# Patient Record
Sex: FEMALE | Race: WHITE
[De-identification: names, ages, dates, MRNs, and addresses within clinical notes are randomized per-mention and may not be internally consistent; named-entity substitution may affect disease eponyms.]

---

## 2017-11-14 ENCOUNTER — HOSPITAL ENCOUNTER (EMERGENCY)
Dept: HOSPITAL 45 - C.EDB | Age: 22
Discharge: HOME | End: 2017-11-14
Payer: COMMERCIAL

## 2017-11-14 VITALS
HEIGHT: 64.02 IN | WEIGHT: 112.44 LBS | BODY MASS INDEX: 19.2 KG/M2 | WEIGHT: 112.44 LBS | BODY MASS INDEX: 19.2 KG/M2 | HEIGHT: 64.02 IN

## 2017-11-14 VITALS — TEMPERATURE: 98.24 F

## 2017-11-14 VITALS — SYSTOLIC BLOOD PRESSURE: 110 MMHG | DIASTOLIC BLOOD PRESSURE: 67 MMHG | HEART RATE: 59 BPM | OXYGEN SATURATION: 98 %

## 2017-11-14 DIAGNOSIS — R06.02: ICD-10-CM

## 2017-11-14 DIAGNOSIS — R07.1: Primary | ICD-10-CM

## 2017-11-14 DIAGNOSIS — F17.200: ICD-10-CM

## 2017-11-14 LAB
ALBUMIN/GLOB SERPL: 1.1 {RATIO} (ref 0.9–2)
ALP SERPL-CCNC: 64 U/L (ref 45–117)
ALT SERPL-CCNC: 24 U/L (ref 12–78)
ANION GAP SERPL CALC-SCNC: 4 MMOL/L (ref 3–11)
AST SERPL-CCNC: 17 U/L (ref 15–37)
BASOPHILS # BLD: 0.02 K/UL (ref 0–0.2)
BASOPHILS NFR BLD: 0.3 %
BUN SERPL-MCNC: 12 MG/DL (ref 7–18)
BUN/CREAT SERPL: 19.3 (ref 10–20)
CALCIUM SERPL-MCNC: 9.2 MG/DL (ref 8.5–10.1)
CHLORIDE SERPL-SCNC: 106 MMOL/L (ref 98–107)
CO2 SERPL-SCNC: 29 MMOL/L (ref 21–32)
COMPLETE: YES
CREAT CL PREDICTED SERPL C-G-VRATE: 114.6 ML/MIN
CREAT SERPL-MCNC: 0.62 MG/DL (ref 0.6–1.2)
EOSINOPHIL NFR BLD AUTO: 184 K/UL (ref 130–400)
GLOBULIN SER-MCNC: 3.5 GM/DL (ref 2.5–4)
GLUCOSE SERPL-MCNC: 87 MG/DL (ref 70–99)
HCT VFR BLD CALC: 38.8 % (ref 37–47)
IG%: 0.2 %
IMM GRANULOCYTES NFR BLD AUTO: 33.3 %
LYMPHOCYTES # BLD: 2.14 K/UL (ref 1.2–3.4)
MAGNESIUM SERPL-MCNC: 2.4 MG/DL (ref 1.8–2.4)
MCH RBC QN AUTO: 34.1 PG (ref 25–34)
MCHC RBC AUTO-ENTMCNC: 34.8 G/DL (ref 32–36)
MCV RBC AUTO: 98 FL (ref 80–100)
MONOCYTES NFR BLD: 8.6 %
NEUTROPHILS # BLD AUTO: 0.8 %
NEUTROPHILS NFR BLD AUTO: 56.8 %
PMV BLD AUTO: 10.4 FL (ref 7.4–10.4)
POTASSIUM SERPL-SCNC: 3.4 MMOL/L (ref 3.5–5.1)
RBC # BLD AUTO: 3.96 M/UL (ref 4.2–5.4)
SODIUM SERPL-SCNC: 139 MMOL/L (ref 136–145)
TSH SERPL-ACNC: 1.02 UIU/ML (ref 0.3–4.5)
WBC # BLD AUTO: 6.43 K/UL (ref 4.8–10.8)

## 2017-11-14 NOTE — DIAGNOSTIC IMAGING REPORT
CHEST 2 VIEWS ROUTINE



CLINICAL HISTORY: 22 years-old Female presenting with b/l upper chest pain,

cough. 



TECHNIQUE: PA and lateral views of the chest were obtained.



COMPARISON: None.



FINDINGS:

Cardiomediastinal silhouette normal. Lungs and pleural spaces clear. Osseous

structures normal. Mild gaseous distention of colon.



IMPRESSION:

1.  No acute cardiopulmonary disease.







Electronically signed by:  Matthew Kaye M.D.

11/14/2017 7:32 PM



Dictated Date/Time:  11/14/2017 7:30 PM

## 2017-11-14 NOTE — EMERGENCY ROOM VISIT NOTE
History


Report prepared by Bhupendra:  Sherine Mcrae


Under the Supervision of:  Dr. Mercedez Cage D.O.


First contact with patient:  18:03


Chief Complaint:  RESPIRATORY PROBLEMS


Stated Complaint:  LUNG PAIN,DIFFICULTY TAKING DEEP BREATHS


Nursing Triage Summary:  


pt c/o it hurting to take a deep breathe for the past 4 days





History of Present Illness


The patient is a 22 year old female who presents to the Emergency Room with 

complaints of constant lung pain beginning 5 days ago. The patient states that 

she has been having pain in her upper chest for the past 5 days and it feels 

like she is not getting a deep breath in the upper part of her lungs. She 

reports that the pain is worsened with laughing, sneezing, coughing, and 

palpation. She notes that she has had a runny nose, shortness of breath, and 

works long shifts as a  and is around people that may be sick frequently. 

The patient denies any fever, chills, stuffy nose, sore throat, nausea, vomiting

, dizziness, lightheadedness, leg swelling, urinary symptoms, bowel symptoms, 

and chance of pregnancy.  No recent change in activity or trauma.





   Source of History:  patient


   Onset:  5 days ago


   Position:  chest


   Timing:  constant


   Modifying Factors (Worsening):  breathing, other (laughing, coughing)


   Associated Symptoms:  + SOB, No fevers, No chills, No sorethroat, No nausea, 

No vomiting, No urinary symptoms


Note:


Pt complains of runny nose. The patient denies any stuffy nose, vomiting, 

dizziness, lightheadedness, leg swelling, bowel symptoms, and chance of 

pregnancy.





Review of Systems


See HPI for pertinent positives & negatives. A total of 10 systems reviewed and 

were otherwise negative.





Past Medical & Surgical


Medical Problems:


(1) No Known Active Medical Problems








Family History


No pertinent family history stated.





Social History


Smoking Status:  Current Every Day Smoker


Marital Status:  single


Housing Status:  lives with roommate


Occupation Status:  [employer] student





Current/Historical Medications


Scheduled


Iud's (Paragard Intrauterine ), 1 EA INT UTER UD





Allergies


Coded Allergies:  


     Sulfamethoxazole w/Trimethoprim (Verified  Allergy, Intermediate, Rash/

Hives, 11/14/17)





Physical Exam


Vital Signs











  Date Time  Temp Pulse Resp B/P (MAP) Pulse Ox O2 Delivery O2 Flow Rate FiO2


 


11/14/17 20:32  59 18 110/67 98   


 


11/14/17 19:49  60 18 107/70 98 Room Air  


 


11/14/17 18:12     98 Room Air  


 


11/14/17 18:00 36.8 78 16 113/65 96 Room Air  











Physical Exam


GENERAL: alert, well appearing, well nourished, no distress, non-toxic 


EYE EXAM: normal conjunctiva, PERRL and EOM's grossly intact


OROPHARYNX: no exudate, no erythema, lips, buccal mucosa, and tongue normal and 

mucous membranes are moist


NECK: supple, no nuchal rigidity, no adenopathy, non-tender


LUNGS: Clear to auscultation. Normal chest wall mechanics


HEART: no murmurs, S1 normal and S2 normal 


CHEST: Some reproducible chest wall pain with palpation.


ABDOMEN: abdomen soft, non-tender, normo-active bowel sounds, no masses, no 

rebound or guarding. 


BACK: Back is symmetrical on inspection and there is no deformity, no midline 

tenderness, no CVA tenderness. 


SKIN: no rashes and no bruising 


UPPER EXTREMITIES: upper extremities are grossly normal. 


LOWER EXTREMITIES: No pitting edema.


NEURO EXAM: Normal sensorium, cranial nerves II-XII grossly intact, normal 

speech,  no gross weakness of arms, no gross weakness of legs.





Medical Decision & Procedures


ER Provider


Diagnostic Interpretation:


Radiology results have been interpreted by the radiologist and reviewed by me.





CHEST 2 VIEWS ROUTINE





FINDINGS:


Cardiomediastinal silhouette normal. Lungs and pleural spaces clear. Osseous


structures normal. Mild gaseous distention of colon.





IMPRESSION:


1.  No acute cardiopulmonary disease.





Electronically signed by:  Matthew Kaye M.D.


11/14/2017 7:32 PM





Dictated Date/Time:  11/14/2017 7:30 PM





Laboratory Results


11/14/17 18:30








Red Blood Count 3.96, Mean Corpuscular Volume 98.0, Mean Corpuscular Hemoglobin 

34.1, Mean Corpuscular Hemoglobin Concent 34.8, Mean Platelet Volume 10.4, 

Neutrophils (%) (Auto) 56.8, Lymphocytes (%) (Auto) 33.3, Monocytes (%) (Auto) 

8.6, Eosinophils (%) (Auto) 0.8, Basophils (%) (Auto) 0.3, Neutrophils # (Auto) 

3.66, Lymphocytes # (Auto) 2.14, Monocytes # (Auto) 0.55, Eosinophils # (Auto) 

0.05, Basophils # (Auto) 0.02





11/14/17 18:30

















Test


  11/14/17


18:30


 


White Blood Count


  6.43 K/uL


(4.8-10.8)


 


Red Blood Count


  3.96 M/uL


(4.2-5.4)


 


Hemoglobin


  13.5 g/dL


(12.0-16.0)


 


Hematocrit 38.8 % (37-47) 


 


Mean Corpuscular Volume


  98.0 fL


()


 


Mean Corpuscular Hemoglobin


  34.1 pg


(25-34)


 


Mean Corpuscular Hemoglobin


Concent 34.8 g/dl


(32-36)


 


Platelet Count


  184 K/uL


(130-400)


 


Mean Platelet Volume


  10.4 fL


(7.4-10.4)


 


Neutrophils (%) (Auto) 56.8 % 


 


Lymphocytes (%) (Auto) 33.3 % 


 


Monocytes (%) (Auto) 8.6 % 


 


Eosinophils (%) (Auto) 0.8 % 


 


Basophils (%) (Auto) 0.3 % 


 


Neutrophils # (Auto)


  3.66 K/uL


(1.4-6.5)


 


Lymphocytes # (Auto)


  2.14 K/uL


(1.2-3.4)


 


Monocytes # (Auto)


  0.55 K/uL


(0.11-0.59)


 


Eosinophils # (Auto)


  0.05 K/uL


(0-0.5)


 


Basophils # (Auto)


  0.02 K/uL


(0-0.2)


 


RDW Standard Deviation


  43.5 fL


(36.4-46.3)


 


RDW Coefficient of Variation


  12.2 %


(11.5-14.5)


 


Immature Granulocyte % (Auto) 0.2 % 


 


Immature Granulocyte # (Auto)


  0.01 K/uL


(0.00-0.02)


 


D-Dimer


  < 190 ug/L FEU


(0-500)


 


Anion Gap


  4.0 mmol/L


(3-11)


 


Est Creatinine Clear Calc


Drug Dose 114.6 ml/min 


 


 


Estimated GFR (


American) 148.3 


 


 


Estimated GFR (Non-


American 128.0 


 


 


BUN/Creatinine Ratio 19.3 (10-20) 


 


Calcium Level


  9.2 mg/dl


(8.5-10.1)


 


Magnesium Level


  2.4 mg/dl


(1.8-2.4)


 


Total Bilirubin


  0.7 mg/dl


(0.2-1)


 


Aspartate Amino Transf


(AST/SGOT) 17 U/L (15-37) 


 


 


Alanine Aminotransferase


(ALT/SGPT) 24 U/L (12-78) 


 


 


Alkaline Phosphatase


  64 U/L


()


 


Troponin I


  < 0.015 ng/ml


(0-0.045)


 


Total Protein


  7.4 gm/dl


(6.4-8.2)


 


Albumin


  3.9 gm/dl


(3.4-5.0)


 


Globulin


  3.5 gm/dl


(2.5-4.0)


 


Albumin/Globulin Ratio 1.1 (0.9-2) 


 


Thyroid Stimulating Hormone


(TSH) 1.020 uIu/ml


(0.300-4.500)





Laboratory results per my review.





Medications Administered











 Medications


  (Trade)  Dose


 Ordered  Sig/Johnny


 Route  Start Time


 Stop Time Status Last Admin


Dose Admin


 


 Ketorolac


 Tromethamine


  (Toradol Inj)  30 mg  NOW  STAT


 IV  11/14/17 19:52


 11/14/17 19:54 DC 11/14/17 20:16


30 MG


 


 Dexamethasone


 Sodium Phosphate


  (Dexamethasone


 Inj **Pf**)  10 mg  STK-MED ONCE


 .ROUTE  11/14/17 20:14


 11/14/17 20:15 DC 11/14/17 20:16


10 MG











ECG


Indication:  SOB/dyspnea


Rate (beats per minute):  60


Rhythm:  sinus rhythm


Findings:  no acute ischemic change, no ectopy, other (normal axis, normal 

intervals)





ED Course


1803: The patient was evaluated in room C8. A complete history and physical 

exam was performed. 





1952: Dexamethasone Sodium Phosphate 10mg/Syringe 2.5ml @ 1mls/min IV, Toradol 

Inj 30mg IV. 





2014: Dexamethasone Sodum Phosphate 10mg. 





2008: I reevaluated and updated the patient on her results. 





2234: Upon reevaluation, the patient is feeling better. I discussed the 

findings and the treatment plan with the patient.  She verbalizes agreement and 

understanding.  The patient was discharged home.





Medical Decision


Differential diagnosis:


Etiologies such as infections, reactive airway disease, pneumonia, pneumothorax

, COPD, CHF, cardiac ischemia, pulmonary embolism, musculoskeletal, 

gastrointestinal, as well as others were entertained.





HEART score 0





low risk wells and PERC negative





Patient well-appearing here, unclear etiology of chest pain.  Labs and imaging 

reassuring.  Doubt ACS, dissection, PE, tamponade, effusion, perforation, 

occult GI bleed, no history to suggest trauma.  Mild URI symptoms recently, 

possibly bronchitis or pleurisy.  Vital signs otherwise stable, patient 

ambulate in with a steady gait and tolerating by mouth at bedside.  Discussed 

with patient follow up with family doctor, symptoms watch and return for, she 

verbalized understanding was agreeable with plan.





Medication Reconcilliation


Current Medication List:  was personally reviewed by me





Blood Pressure Screening


Patient's blood pressure:  Normal blood pressure


Blood pressure disposition:  Did not require urgent referral





Impression





 Primary Impression:  


 Chest pain





Scribe Attestation


The scribe's documentation has been prepared under my direction and personally 

reviewed by me in its entirety. I confirm that the note above accurately 

reflects all work, treatment, procedures, and medical decision making performed 

by me.





Departure Information


Dispostion


Home / Self-Care





Forms


HOME CARE DOCUMENTATION FORM,                                                 

               IMPORTANT VISIT INFORMATION, WORK / SCHOOL INSTRUCTIONS





Patient Instructions


My [facility]





Additional Instructions





Please follow-up with your regular doctor regarding your pain.  Please continue 

to monitor for any changes.  If you have any worsening pain, develop trouble 

breathing, fevers, worsening cough, notice blood in your sputum, vomiting, 

diarrhea, dizziness, or you have any other new or concerning symptoms, please 

return to the emergency room.





Problem Qualifiers








 Primary Impression:  


 Chest pain


 Chest pain type:  chest pain on breathing  Qualified Codes:  R07.1 - Chest 

pain on breathing

## 2018-03-30 ENCOUNTER — HOSPITAL ENCOUNTER (OUTPATIENT)
Dept: HOSPITAL 45 - C.LAB | Age: 23
Discharge: HOME | End: 2018-03-30
Payer: COMMERCIAL

## 2018-03-30 DIAGNOSIS — Z02.83: Primary | ICD-10-CM
